# Patient Record
(demographics unavailable — no encounter records)

---

## 2025-05-08 NOTE — HISTORY OF PRESENT ILLNESS
[Patient reported PAP Smear was normal] : Patient reported PAP Smear was normal [postmenopausal] : postmenopausal [Y] : Positive pregnancy history [Currently Active] : currently active [Men] : men [No] : No [Mammogramdate] : 05/24 [PapSmeardate] : 04/30/24 [TextBox_31] : HPV NEGATIVE [BoneDensityDate] : 04/23 [TextBox_37] : Z&P [ColonoscopyDate] : 2024 [LMPDate] :  [PGHxTotal] : 2 [[facility] MaryvalexFullTerm] : 2 [[facility] Center[facility]Living] : 2 [FreeTextEntry1] :  X 2